# Patient Record
Sex: MALE | Race: WHITE
[De-identification: names, ages, dates, MRNs, and addresses within clinical notes are randomized per-mention and may not be internally consistent; named-entity substitution may affect disease eponyms.]

---

## 2017-12-09 ENCOUNTER — HOSPITAL ENCOUNTER (EMERGENCY)
Dept: HOSPITAL 62 - ER | Age: 67
Discharge: HOME | End: 2017-12-09
Payer: MEDICARE

## 2017-12-09 VITALS — SYSTOLIC BLOOD PRESSURE: 138 MMHG | DIASTOLIC BLOOD PRESSURE: 81 MMHG

## 2017-12-09 DIAGNOSIS — M25.562: ICD-10-CM

## 2017-12-09 DIAGNOSIS — S83.92XA: Primary | ICD-10-CM
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
## 2017-12-09 PROCEDURE — 99283 EMERGENCY DEPT VISIT LOW MDM: CPT

## 2017-12-09 NOTE — RADIOLOGY REPORT (SQ)
EXAM DESCRIPTION: 



KNEE LEFT 3 VIEWS



CLINICAL HISTORY: 



67 years, Male, knee pain



COMPARISON:

None.



NUMBER OF VIEWS:

4



LIMITATIONS:

None.



FINDINGS:



Moderate chondrocalcinosis of the medial and lateral

compartments. Atherosclerosis. No effusion.



IMPRESSION:



Moderate chondrocalcinosis of the left knee.

 



 2011 [employer]- All Rights Reserved

## 2017-12-09 NOTE — ER DOCUMENT REPORT
ED General





- General


Chief Complaint: Knee Injury


Stated Complaint: LEFT KNEE PAIN


Time Seen by Provider: 12/09/17 02:22


TRAVEL OUTSIDE OF THE [place] IN LAST 30 DAYS: No





- HPI


Patient complains to provider of: Left knee pain


Notes: 





Patient coming in for evaluation of left knee pain.  Patient states he twisted 

his left knee denied.  Patient does not feel a pop.  Patient states difficulty 

in ambulating on his knee.  Patient states he has pain in the medial side of 

his knee especially to palpation.





- Related Data


Allergies/Adverse Reactions: 


 





No Known Allergies Allergy (Unverified 01/22/13 06:03)


 











Past Medical History





- Social History


Smoking Status: Unknown if Ever Smoked


Family History: Reviewed & Not Pertinent


Patient has suicidal ideation: No


Patient has homicidal ideation: No





- Past Medical History


Cardiac Medical History: 


   Denies: Hx Coronary Artery Disease, Hx Heart Attack, Hx Hypertension


Pulmonary Medical History: Reports: Hx Bronchitis - HX OF BRONCHITIS (RESOLVED)


   Denies: Hx COPD, Hx Pneumonia


Neurological Medical History: Denies: Hx Cerebrovascular Accident, Hx Seizures


Renal/ Medical History: Denies: Hx Peritoneal Dialysis


Musculoskeltal Medical History: Denies Hx Arthritis


Past Surgical History: Reports: Hx Orthopedic Surgery - [facility], LUMBAR LAMINECTOMY





- Immunizations


Hx Diphtheria, Pertussis, Tetanus Vaccination: Yes





Review of Systems





- Review of Systems


Constitutional: No symptoms reported


EENT: No symptoms reported


Cardiovascular: No symptoms reported


Respiratory: No symptoms reported


Gastrointestinal: No symptoms reported


Genitourinary: No symptoms reported


Male Genitourinary: No symptoms reported


Musculoskeletal: Other - Knee pain


Skin: No symptoms reported


Hematologic/Lymphatic: No symptoms reported


Neurological/Psychological: No symptoms reported


-: Yes All other systems reviewed and negative





Physical Exam





- Vital signs


Vitals: 





 











Temp Pulse Resp BP Pulse Ox


 


 97.7 F   83   18   141/79 H  98 


 


 12/09/17 01:46  12/09/17 01:46  12/09/17 01:46  12/09/17 01:46  12/09/17 01:46











Interpretation: Normal





- General


General appearance: Appears well, Alert





- HEENT


Head: Normocephalic, Atraumatic


Eyes: Normal


Pupils: PERRL





- Back


Back: Normal, Nontender





- Extremities


General upper extremity: Normal inspection, Nontender, Normal color, Normal ROM

, Normal temperature


General lower extremity: Normal inspection, Normal color, Normal ROM, Normal 

temperature, Normal weight bearing, Other - Patient has tenderness to palpation 

of the medial collateral ligament.  Patient has no pain though r laxity on 

valgus varus anterior posterior drawer testing of the left knee..  No: Adán's 

sign





- Neurological


Neuro grossly intact: Yes


Cognition: Normal


Orientation: AAOx4


[place] Coma Scale Eye Opening: Spontaneous


[place] Coma Scale Verbal: Oriented


[place] Coma Scale Motor: Obeys Commands


[place] Coma Scale Total: 15


Speech: Normal


Motor strength normal: LUE, RUE, LLE, RLE


Sensory: Normal





- Psychological


Associated symptoms: Normal affect, Normal mood





- Skin


Skin Temperature: Warm


Skin Moisture: Dry


Skin Color: Normal





Course





- Re-evaluation


Re-evalutation: 





12/09/17 05:14


X-ray was performed no signs of fracture diffuse arthritic changes.  Explained 

to patient that we would not be available to see any signs of ACL PCL tear is 

that this is the patient's main concern.  Recommended we can have her rest ice 

elevation support with Ace bandage follow-up with his primary care physician if 

this continued for possible MRI at that time.





- Vital Signs


Vital signs: 





 











Temp Pulse Resp BP Pulse Ox


 


 97.7 F   83   18   141/79 H  98 


 


 12/09/17 01:46  12/09/17 01:46  12/09/17 01:46  12/09/17 01:46  12/09/17 01:46














Discharge





- Discharge


Clinical Impression: 


Knee sprain


Qualifiers:


 Encounter type: sequela Involved ligament of knee: unspecified ligament 

Laterality: left Qualified Code(s): S83.92XS - Sprain of unspecified site of 

left knee, sequela





Condition: Good


Disposition: HOME, SELF-CARE


Instructions:  Ice & Elevation ([facility]), Suspected Internal Knee Injury ([facility]), 

Sprained Knee ([facility]), Oral Narcotic Medication ([facility]), Ace Wrap ([facility])


Additional Instructions: 


Your x-ray shows extensive osteoarthritis of your knee however is no signs of 

any fracture.  More likely sprained her knee or possibly tore ligament.  I do 

not have the capability of diagnosing that here in the ER is that we do not 

have an MRI would highly recommend keeping the knee wrapped with the Ace 

bandage.  He may take Tylenol and Motrin for your pain may take pain medication 

prescribed as directed.  Return to the ER symptoms worsen.  Follow-up with your 

primary care physician or the orthopedic provided.


Prescriptions: 


Hydrocodone Bit/Acetaminophen [Hydrocodon-Acetaminophen 5-325] 1 each PO Q6 #14 

tablet


Forms:  Return to Work


Referrals: 


LIBERTAD ESCOBEDO MD [Primary Care Provider] - Follow up as needed


VASU BARRON MD [ACTIVE STAFF] - Follow up as needed

## 2018-10-25 ENCOUNTER — HOSPITAL ENCOUNTER (OUTPATIENT)
Dept: HOSPITAL 62 - END | Age: 68
Discharge: HOME | End: 2018-10-25
Attending: INTERNAL MEDICINE
Payer: COMMERCIAL

## 2018-10-25 VITALS — SYSTOLIC BLOOD PRESSURE: 126 MMHG | DIASTOLIC BLOOD PRESSURE: 77 MMHG

## 2018-10-25 DIAGNOSIS — Z12.11: Primary | ICD-10-CM

## 2018-10-25 DIAGNOSIS — K64.8: ICD-10-CM

## 2018-10-25 DIAGNOSIS — D12.3: ICD-10-CM

## 2018-10-25 DIAGNOSIS — D12.0: ICD-10-CM

## 2018-10-25 PROCEDURE — 45385 COLONOSCOPY W/LESION REMOVAL: CPT

## 2018-10-25 PROCEDURE — 88305 TISSUE EXAM BY PATHOLOGIST: CPT

## 2018-10-25 RX ADMIN — FENTANYL CITRATE ONE MCG: 50 INJECTION INTRAMUSCULAR; INTRAVENOUS at 12:39

## 2018-10-25 RX ADMIN — FENTANYL CITRATE ONE MCG: 50 INJECTION INTRAMUSCULAR; INTRAVENOUS at 12:46

## 2018-10-25 RX ADMIN — MIDAZOLAM HYDROCHLORIDE ONE MG: 1 INJECTION, SOLUTION INTRAMUSCULAR; INTRAVENOUS at 12:37

## 2018-10-25 RX ADMIN — FENTANYL CITRATE ONE MCG: 50 INJECTION INTRAMUSCULAR; INTRAVENOUS at 12:43

## 2018-10-25 RX ADMIN — MIDAZOLAM HYDROCHLORIDE ONE MG: 1 INJECTION, SOLUTION INTRAMUSCULAR; INTRAVENOUS at 12:50

## 2018-10-25 RX ADMIN — MIDAZOLAM HYDROCHLORIDE ONE MG: 1 INJECTION, SOLUTION INTRAMUSCULAR; INTRAVENOUS at 12:41

## 2018-10-25 NOTE — OPERATIVE REPORT
Operative Report


DATE OF SURGERY: 10/25/18


Operative Report: 





The risks, benefits and alternatives of the procedure including the risks of 

bleeding, perforation requiring surgery are explained to the patient in detail 

and informed consent is obtained.  Patient is placed in a left, lateral 

decubital position.  Timeout was called.  Conscious sedation medications are 

provided.  A rectal examination is done which did not reveal any masses, tears 

or fissures.  An Olympus videoscope was inserted to the patient's rectum.  The 

scope was then carefully advanced all the way to the cecum.  The cecum was 

identified by the usual anatomical landmarks including the ileocecal valve as 

well as the appendiceal office.  Photodocumentation is obtained.  Prep was 

good.  The scope was then sequentially pulled back via the rest segments of the 

colon including the ascending colon, hepatic flexure, transverse colon, splenic 

flexure, descending colon and finally into the rectosigmoid portions of the 

colon.  Retroflexion maneuvers performed.


PREOPERATIVE DIAGNOSIS: Colorectal cancer screening


POSTOPERATIVE DIAGNOSIS: Large polyp noted in the area of the cecum status post 

removal with snare polypectomy.  2 endoclips were placed at the base of the 

resected site to reduce the risk of post polypectomy bleeding.  2 transverse 

colon polyps are noted and removed via snare polypectomy and retrieved.  

Internal hemorrhoids


OPERATION: Colonoscopy with snare polypectomy


SURGEON: MOODY ESPINAL


ANESTHESIA: Moderate Sedation - 6 mg of Versed, 100 mcg of fentanyl.  Conscious 

sedation monitoring time 30 minutes.


TISSUE REMOVED OR ALTERED: As noted above.


COMPLICATIONS: 





None.


ESTIMATED BLOOD LOSS: None.


INTRAOPERATIVE FINDINGS: As noted above.


PROCEDURE: 





Patient tolerated the procedure well.


No immediate postprocedure complications are noted.


Patient discharged in good condition.


Discharge date 10/25/2018.


Discharge diet: Regular.


Discharge activity: Regular.


2-3-week follow-up to discuss findings.


Patient is instructed call the office or proceed to the emergency room should 

there be any further proximal questions.


Suspect could probably go for 3-year surveillance although depending on the 

pathology of the polyp may need to see him next year for surveillance 

colonoscopy.